# Patient Record
Sex: MALE | Race: OTHER | HISPANIC OR LATINO | URBAN - METROPOLITAN AREA
[De-identification: names, ages, dates, MRNs, and addresses within clinical notes are randomized per-mention and may not be internally consistent; named-entity substitution may affect disease eponyms.]

---

## 2024-07-22 ENCOUNTER — EMERGENCY (EMERGENCY)
Facility: HOSPITAL | Age: 24
LOS: 1 days | Discharge: OTHER INSTITUTION | End: 2024-07-22
Attending: STUDENT IN AN ORGANIZED HEALTH CARE EDUCATION/TRAINING PROGRAM
Payer: COMMERCIAL

## 2024-07-22 VITALS — HEIGHT: 67 IN | WEIGHT: 156.97 LBS

## 2024-07-22 LAB
AMPHET UR-MCNC: NEGATIVE — SIGNIFICANT CHANGE UP
ANION GAP SERPL CALC-SCNC: 16 MMOL/L — SIGNIFICANT CHANGE UP (ref 5–17)
APAP SERPL-MCNC: <3 UG/ML — LOW (ref 10–26)
APPEARANCE UR: CLEAR — SIGNIFICANT CHANGE UP
BACTERIA # UR AUTO: NEGATIVE /HPF — SIGNIFICANT CHANGE UP
BARBITURATES UR SCN-MCNC: NEGATIVE — SIGNIFICANT CHANGE UP
BASOPHILS # BLD AUTO: 0.12 K/UL — SIGNIFICANT CHANGE UP (ref 0–0.2)
BASOPHILS NFR BLD AUTO: 0.7 % — SIGNIFICANT CHANGE UP (ref 0–2)
BENZODIAZ UR-MCNC: NEGATIVE — SIGNIFICANT CHANGE UP
BILIRUB UR-MCNC: NEGATIVE — SIGNIFICANT CHANGE UP
BUN SERPL-MCNC: 9.3 MG/DL — SIGNIFICANT CHANGE UP (ref 8–20)
CALCIUM SERPL-MCNC: 9.1 MG/DL — SIGNIFICANT CHANGE UP (ref 8.4–10.5)
CHLORIDE SERPL-SCNC: 101 MMOL/L — SIGNIFICANT CHANGE UP (ref 96–108)
CO2 SERPL-SCNC: 23 MMOL/L — SIGNIFICANT CHANGE UP (ref 22–29)
COCAINE METAB.OTHER UR-MCNC: NEGATIVE — SIGNIFICANT CHANGE UP
COLOR SPEC: YELLOW — SIGNIFICANT CHANGE UP
CREAT SERPL-MCNC: 0.92 MG/DL — SIGNIFICANT CHANGE UP (ref 0.5–1.3)
DIFF PNL FLD: NEGATIVE — SIGNIFICANT CHANGE UP
EGFR: 120 ML/MIN/1.73M2 — SIGNIFICANT CHANGE UP
EOSINOPHIL # BLD AUTO: 0.03 K/UL — SIGNIFICANT CHANGE UP (ref 0–0.5)
EOSINOPHIL NFR BLD AUTO: 0.2 % — SIGNIFICANT CHANGE UP (ref 0–6)
ETHANOL SERPL-MCNC: <10 MG/DL — SIGNIFICANT CHANGE UP (ref 0–9)
FENTANYL UR QL SCN: POSITIVE
GLUCOSE SERPL-MCNC: 124 MG/DL — HIGH (ref 70–99)
GLUCOSE UR QL: 100 MG/DL
HCT VFR BLD CALC: 44.3 % — SIGNIFICANT CHANGE UP (ref 39–50)
HGB BLD-MCNC: 15.2 G/DL — SIGNIFICANT CHANGE UP (ref 13–17)
IMM GRANULOCYTES NFR BLD AUTO: 1.3 % — HIGH (ref 0–0.9)
KETONES UR-MCNC: NEGATIVE MG/DL — SIGNIFICANT CHANGE UP
LEUKOCYTE ESTERASE UR-ACNC: NEGATIVE — SIGNIFICANT CHANGE UP
LYMPHOCYTES # BLD AUTO: 1.14 K/UL — SIGNIFICANT CHANGE UP (ref 1–3.3)
LYMPHOCYTES # BLD AUTO: 6.9 % — LOW (ref 13–44)
MCHC RBC-ENTMCNC: 28.8 PG — SIGNIFICANT CHANGE UP (ref 27–34)
MCHC RBC-ENTMCNC: 34.3 GM/DL — SIGNIFICANT CHANGE UP (ref 32–36)
MCV RBC AUTO: 83.9 FL — SIGNIFICANT CHANGE UP (ref 80–100)
METHADONE UR-MCNC: NEGATIVE — SIGNIFICANT CHANGE UP
MONOCYTES # BLD AUTO: 0.72 K/UL — SIGNIFICANT CHANGE UP (ref 0–0.9)
MONOCYTES NFR BLD AUTO: 4.3 % — SIGNIFICANT CHANGE UP (ref 2–14)
NEUTROPHILS # BLD AUTO: 14.35 K/UL — HIGH (ref 1.8–7.4)
NEUTROPHILS NFR BLD AUTO: 86.6 % — HIGH (ref 43–77)
NITRITE UR-MCNC: NEGATIVE — SIGNIFICANT CHANGE UP
OPIATES UR-MCNC: NEGATIVE — SIGNIFICANT CHANGE UP
PCP SPEC-MCNC: SIGNIFICANT CHANGE UP
PCP UR-MCNC: NEGATIVE — SIGNIFICANT CHANGE UP
PH UR: 6.5 — SIGNIFICANT CHANGE UP (ref 5–8)
PLATELET # BLD AUTO: 263 K/UL — SIGNIFICANT CHANGE UP (ref 150–400)
POTASSIUM SERPL-MCNC: 4.7 MMOL/L — SIGNIFICANT CHANGE UP (ref 3.5–5.3)
POTASSIUM SERPL-SCNC: 4.7 MMOL/L — SIGNIFICANT CHANGE UP (ref 3.5–5.3)
PROT UR-MCNC: 100 MG/DL
RBC # BLD: 5.28 M/UL — SIGNIFICANT CHANGE UP (ref 4.2–5.8)
RBC # FLD: 12.4 % — SIGNIFICANT CHANGE UP (ref 10.3–14.5)
RBC CASTS # UR COMP ASSIST: 2 /HPF — SIGNIFICANT CHANGE UP (ref 0–4)
SALICYLATES SERPL-MCNC: <0.6 MG/DL — LOW (ref 10–20)
SODIUM SERPL-SCNC: 140 MMOL/L — SIGNIFICANT CHANGE UP (ref 135–145)
SP GR SPEC: 1.02 — SIGNIFICANT CHANGE UP (ref 1–1.03)
SQUAMOUS # UR AUTO: 0 /HPF — SIGNIFICANT CHANGE UP (ref 0–5)
THC UR QL: NEGATIVE — SIGNIFICANT CHANGE UP
UROBILINOGEN FLD QL: 1 MG/DL — SIGNIFICANT CHANGE UP (ref 0.2–1)
WBC # BLD: 16.57 K/UL — HIGH (ref 3.8–10.5)
WBC # FLD AUTO: 16.57 K/UL — HIGH (ref 3.8–10.5)
WBC UR QL: 1 /HPF — SIGNIFICANT CHANGE UP (ref 0–5)

## 2024-07-22 PROCEDURE — 93010 ELECTROCARDIOGRAM REPORT: CPT

## 2024-07-22 PROCEDURE — 99291 CRITICAL CARE FIRST HOUR: CPT

## 2024-07-22 RX ORDER — DIAZEPAM 10 MG/1
5 TABLET ORAL ONCE
Refills: 0 | Status: DISCONTINUED | OUTPATIENT
Start: 2024-07-22 | End: 2024-07-22

## 2024-07-22 RX ORDER — ONDANSETRON HYDROCHLORIDE 2 MG/ML
8 INJECTION INTRAMUSCULAR; INTRAVENOUS ONCE
Refills: 0 | Status: DISCONTINUED | OUTPATIENT
Start: 2024-07-22 | End: 2024-07-30

## 2024-07-22 RX ORDER — METOCLOPRAMIDE 5 MG/5ML
10 SOLUTION ORAL ONCE
Refills: 0 | Status: DISCONTINUED | OUTPATIENT
Start: 2024-07-22 | End: 2024-07-22

## 2024-07-22 RX ORDER — METOCLOPRAMIDE 5 MG/5ML
10 SOLUTION ORAL ONCE
Refills: 0 | Status: COMPLETED | OUTPATIENT
Start: 2024-07-22 | End: 2024-07-22

## 2024-07-22 RX ORDER — DIAZEPAM 10 MG/1
2.5 TABLET ORAL ONCE
Refills: 0 | Status: DISCONTINUED | OUTPATIENT
Start: 2024-07-22 | End: 2024-07-22

## 2024-07-22 RX ORDER — SODIUM CHLORIDE 0.9 % (FLUSH) 0.9 %
1000 SYRINGE (ML) INJECTION ONCE
Refills: 0 | Status: COMPLETED | OUTPATIENT
Start: 2024-07-22 | End: 2024-07-22

## 2024-07-22 RX ADMIN — Medication 1000 MILLILITER(S): at 20:06

## 2024-07-22 RX ADMIN — DIAZEPAM 2.5 MILLIGRAM(S): 10 TABLET ORAL at 20:05

## 2024-07-22 RX ADMIN — METOCLOPRAMIDE 10 MILLIGRAM(S): 5 SOLUTION ORAL at 20:06

## 2024-07-22 NOTE — ED PROVIDER NOTE - NSFOLLOWUPINSTRUCTIONS_ED_ALL_ED_FT
-Please follow-up with your primary care doctor in the next 2 days.  Please call tomorrow for an appointment.  If you cannot follow-up with your primary care doctor please return to the ED for any urgent issues.  - You were given a copy of the tests performed today.  Please bring the results with you and review them with your primary care doctor.  - If you have any worsening of symptoms or any other concerns please return to the ED immediately.  - Please continue taking your home medications as directed.    Substance Abuse  Polysubstance abuse is the abuse of 2 or more drugs that cause impairment or distress. Examples include alcohol, nicotine, marijuana, cocaine, heroin, methamphetamine, hallucinogens such as mushrooms, or inhalants such as paint thinner. Prescribed medicines, such as opioids for pain or benzodiazepines for anxiety, are also commonly abused.    DISCHARGE INSTRUCTIONS:    Call 911 for any of the following:     You feel you might harm yourself or others.         Return to the emergency department if:     You have a seizure.       You have chest pain and your heart is beating faster than usual.       You have new shortness of breath.       You are dizzy and lightheaded.     Contact your healthcare provider or therapist if:     You are using drugs and think you are pregnant.       You have withdrawal symptoms and want to start using drugs again.       You have questions or concerns about your condition or care.     Risks of polysubstance abuse:     Drug dependence is when you continue to use drugs, even when you know the risks. Polysubstance abuse can damage your heart, brain, lungs, liver, and gastrointestinal tract. You continue even when it causes problems with work, school, or relationships. You may have difficulty finding or keeping a job because of your drug dependence.       Drug tolerance is when you need to use more drugs, or use them more often, to get the effects you want. You may not be able to stop using the drugs. When you try to stop, you may have withdrawal symptoms and strong cravings for the drugs.      Drug overdose can occur when you take more drugs than your body can handle. This may be a small amount or a large amount. You can lose consciousness or have a seizure or stroke. Your heart can stop beating, or you can stop breathing. You may die from a drug overdose.     Medicines:     Withdrawal medicines may be given according to the types of drugs you are abusing. Withdrawal from drugs can cause serious, life-threatening side effects. Certain medicines can help decrease your withdrawal symptoms and your desire for the drug. Ask for more information about the withdrawal medicines you may need.       Mood stabilizers may be given to help prevent or treat depression or anxiety caused by drug abuse and withdrawal.       Take your medicine as directed. Contact your healthcare provider if you think your medicine is not helping or if you have side effects. Tell him or her if you are allergic to any medicine. Keep a list of the medicines, vitamins, and herbs you take. Include the amounts, and when and why you take them. Bring the list or the pill bottles to follow-up visits. Carry your medicine list with you in case of an emergency.    Follow up with your healthcare provider as directed: You may be referred to a specialist to treat health conditions caused by your drug use. This includes mental health, heart, or lung specialists. Write down your questions so you remember to ask them during your visits.     Therapy: You may need therapy and support to stop using drugs:     Cognitive and behavioral therapy helps you change your thinking and behavior. It can help you develop plans to avoid the situations that make you want to use drugs. It also helps you cope with the feelings of wanting to use drugs. You may have individual or group therapy.       Contingency management helps you set drug-free goals with a therapist. You will decide ways to celebrate your success when you reach a goal.       Family therapy and support groups allow you and your family members to talk to and be encouraged by other people affected by drug abuse. You and your family members may attend together or separately. Ask your healthcare provider for information about programs in your area.     How polysubstance abuse affects unborn or  babies:     If you are pregnant or get pregnant while using drugs, you may have a miscarriage or give birth early. Your baby may be born addicted to the drugs.      Do not breastfeed your baby if you use drugs. Drugs pass from your bloodstream into your breast milk and affect your baby's health. Talk with your healthcare provider if you are using drugs and breastfeeding.    Interested in discussing options to reduce your alcohol or drug use?      [facility]: [phone]   [facility] Substance Abuse Services: [phone], option #2   Methadone Maintenance & Ambulatory Opiate Detox: [phone]  [facility]: [phone]  [facility]: [phone]  [facility]: [phone]    [facility]: [phone], option #2   [facility]: [phone]    [facility]: [phone]    [facility] Central Intake: [phone]  [facility] Chemical Dependency/Ancillary Withdrawal: [phone]  [facility] Methadone Maintenance: [phone]    [facility]: [phone]  [facility] Addiction Treatment Services: [phone]    [facility]: 8-261-3-HOPENY    [facility] Office of Alcoholism and Substance Abuse Services (OASAS): https://www.oasas.[place].gov/providerdirectory/  [facility] for Addiction Services and Psychotherapy Interventions Research (CASPIR)  www.[facility].org     Interested in discussing options to reduce your tobacco use?    [facility] for Tobacco Control:  [phone]  [facility] QUITLINE: 9-909-MS-QUITS ([phone])    Interested in learning more about substance use?      http://rethinkingdrinking.niaaa.nih.gov   https://www.drugabuse.gov/patients-families     Learn more about opioid overdose prevention programs in [facility]:  http://www.health.ny.gov/diseases/aids/general/opioid_overdose_prevention/

## 2024-07-22 NOTE — ED PROVIDER NOTE - ATTENDING CONTRIBUTION TO CARE
I, Raghu Houston MD, spent 35 minutes of critical care time with this patient. This does not include time spent on separately reported billable procedures.    I, Raghu Houston MD, performed the initial face to face bedside interview with this patient regarding history of present illness, review of symptoms and relevant past medical, social and family history.  I completed an independent physical examination.  I was the initial provider who evaluated this patient. I have signed out the follow up of any pending tests (i.e. labs, radiological studies) to the resident.  I have communicated the patient’s plan of care and disposition with the resident.    24 year old male c/o malaise after taking Percocet. AVSS. PE unremarkable. supportive care. I, Raghu Houston MD, spent 35 minutes of critical care time with this patient. This does not include time spent on separately reported billable procedures.    I, Raghu Houston MD, performed the initial face to face bedside interview with this patient regarding history of present illness, review of symptoms and relevant past medical, social and family history.  I completed an independent physical examination.  I was the initial provider who evaluated this patient. I have signed out the follow up of any pending tests (i.e. labs, radiological studies) to the resident.  I have communicated the patient’s plan of care and disposition with the resident.    24 year old male c/o malaise after taking Percocet. + suicide attempt. + depression AVSS. PE unremarkable. Psychiatry assessment

## 2024-07-22 NOTE — ED PROVIDER NOTE - PROGRESS NOTE DETAILS
Pt now admitting w/ mother at bedside that he no longer wants to live and endorsing passive SI. No specific plan. Will place 1:1, psych eval. -Amelia

## 2024-07-22 NOTE — ED PROVIDER NOTE - CLINICAL SUMMARY MEDICAL DECISION MAKING FREE TEXT BOX
24-year-old male patient no past medical history accidentally overdosed on Percocet.  4 of Narcan IN on the scene.  Awake alert, occasional vomiting.  Antiemetics and observation in the ED.

## 2024-07-22 NOTE — ED ADULT NURSE NOTE - OBJECTIVE STATEMENT
pt is AxOx3, c/o overdosing on percocet, pt states he "doesn't remember how many he took" breathing even and unlabored. pt is aware of plan of care.

## 2024-07-22 NOTE — ED ADULT TRIAGE NOTE - CHIEF COMPLAINT QUOTE
Pt BIBA after taking a percocet and becoming unresponsive. Was given Narcan 4mg IN by [employer]. Pt awake and vomiting

## 2024-07-22 NOTE — ED PROVIDER NOTE - OBJECTIVE STATEMENT
24-year-old male patient no past medical history accidentally overdosed on Percocet today.  Does not take Percocet every day.  Was found by [facility] police and given 4 of Narcan intranasally.  Woke up and feels nauseous.  Occasional vomiting.  No other complaints.  No recent traumas or falls. 24-year-old male patient no past medical history accidentally overdosed on Percocet today.  Does not take Percocet every day.  Was found by [facility] police and given 4 of Narcan intranasally.  Woke up and feels nauseous.  Occasional vomiting.  No other complaints.  No recent traumas or falls. denying SI/HI

## 2024-07-23 DIAGNOSIS — F39 UNSPECIFIED MOOD [AFFECTIVE] DISORDER: ICD-10-CM

## 2024-07-23 LAB
HCT VFR BLD CALC: 40.8 % — SIGNIFICANT CHANGE UP (ref 39–50)
HGB BLD-MCNC: 14.3 G/DL — SIGNIFICANT CHANGE UP (ref 13–17)
MCHC RBC-ENTMCNC: 29.6 PG — SIGNIFICANT CHANGE UP (ref 27–34)
MCHC RBC-ENTMCNC: 35 GM/DL — SIGNIFICANT CHANGE UP (ref 32–36)
MCV RBC AUTO: 84.5 FL — SIGNIFICANT CHANGE UP (ref 80–100)
PLATELET # BLD AUTO: 207 K/UL — SIGNIFICANT CHANGE UP (ref 150–400)
RBC # BLD: 4.83 M/UL — SIGNIFICANT CHANGE UP (ref 4.2–5.8)
RBC # FLD: 12.2 % — SIGNIFICANT CHANGE UP (ref 10.3–14.5)
SARS-COV-2 RNA SPEC QL NAA+PROBE: SIGNIFICANT CHANGE UP
WBC # BLD: 9.36 K/UL — SIGNIFICANT CHANGE UP (ref 3.8–10.5)
WBC # FLD AUTO: 9.36 K/UL — SIGNIFICANT CHANGE UP (ref 3.8–10.5)

## 2024-07-23 PROCEDURE — 99213 OFFICE O/P EST LOW 20 MIN: CPT

## 2024-07-23 PROCEDURE — 96375 TX/PRO/DX INJ NEW DRUG ADDON: CPT

## 2024-07-23 PROCEDURE — 99291 CRITICAL CARE FIRST HOUR: CPT | Mod: 25

## 2024-07-23 PROCEDURE — 99223 1ST HOSP IP/OBS HIGH 75: CPT

## 2024-07-23 PROCEDURE — 36415 COLL VENOUS BLD VENIPUNCTURE: CPT

## 2024-07-23 PROCEDURE — G0378: CPT

## 2024-07-23 PROCEDURE — 93005 ELECTROCARDIOGRAM TRACING: CPT

## 2024-07-23 PROCEDURE — 87635 SARS-COV-2 COVID-19 AMP PRB: CPT

## 2024-07-23 PROCEDURE — 80048 BASIC METABOLIC PNL TOTAL CA: CPT

## 2024-07-23 PROCEDURE — 85027 COMPLETE CBC AUTOMATED: CPT

## 2024-07-23 PROCEDURE — 90792 PSYCH DIAG EVAL W/MED SRVCS: CPT | Mod: 95

## 2024-07-23 PROCEDURE — 80307 DRUG TEST PRSMV CHEM ANLYZR: CPT

## 2024-07-23 PROCEDURE — 82962 GLUCOSE BLOOD TEST: CPT

## 2024-07-23 PROCEDURE — 85025 COMPLETE CBC W/AUTO DIFF WBC: CPT

## 2024-07-23 PROCEDURE — 96374 THER/PROPH/DIAG INJ IV PUSH: CPT

## 2024-07-23 PROCEDURE — 81001 URINALYSIS AUTO W/SCOPE: CPT

## 2024-07-23 NOTE — ED BEHAVIORAL HEALTH ASSESSMENT NOTE - NSBHMSEGAIT_PSY_A_CORE
[facility] Registered Nurse placed a call to the parent to inform them of the patient's test results and MD recommendation in regards to the message below from Dr. Ridley.   Parent verbalized understanding.     RN used an .     ----- Message from Sofia Ridley MD sent at 2/11/2022  4:52 PM CST -----  Please contact mom using a  and let her know that the radiologist reported a mild curvature of the back.  I would suggest that we see him back here in 6 months for re-evaluation and the x-ray can be repeated at that point.       Unable to assess

## 2024-07-23 NOTE — ED BEHAVIORAL HEALTH ASSESSMENT NOTE - RISK ASSESSMENT
rf - overdose, decline in functioning, unemployment, substance use, poor insight/judgment, not in outpt care  pf - supportive family, young, healthy, no prior adm, no prior SAs, future oriented

## 2024-07-23 NOTE — ED BEHAVIORAL HEALTH PROGRESS NOTE - SUMMARY
Overdosed on Percocet one year ago  1 prior overdose one year ago; hx DWI; loss of his father 3 years ago; not connected to mental health treatment

## 2024-07-23 NOTE — ED BEHAVIORAL HEALTH NOTE - BEHAVIORAL HEALTH NOTE
LMSW attempted to obtain collateral from Alivia, girlfriend - [phone]. Unable to reach, LVM providing callback #. Attending aware.

## 2024-07-23 NOTE — ED BEHAVIORAL HEALTH NOTE - BEHAVIORAL HEALTH NOTE
=======================  FOR EACH COLLATERAL  ========================  Collateral below has requested that the information provided remain confidential: Yes [  ] No [x]  Collateral below has provided information that patient is/may be unaware of: Yes [  ] No [x]    Rationale for overriding objection            (  ) Lack of capacity. Details: ________            (x) Assessing risk of danger to self/others.  Rationale for selecting specific collateral source            (  ) Potential to impact risk of danger to self/others and no alternative equivalent. Details: _____  NAME: Shayy  NUMBER: [phone]  RELATIONSHIP: Mother  RELIABILITY: Good  COMMENTS: [place]ian speaking  ========================  PATIENT DEMOGRAPHICS:  ========================  HPI Pt is a 25 y/o male BIB EMS after being found unresponsive from an overdose,   BASELINE FUNCTIONING:  DATE HPI STARTED: 3 years ago, patient's father passed away.   DECOMPENSATION: As per mom, pt became very depressed after his father passed and began drinking and doing drugs. Pt does not have any healthy coping skills. Pt does not endorse any reasons to live and has been making statements such as "what's the point of living if my dad isn't alive." Pt had an overdose on fentanyl in May 2023 and mom believes that was a suicide attempt. Mom reports today pt left without sayung where he was going and then later received a phone call from the hospital reporting another overdose. Mom reports at bedside pt endorsed not wanting to live. Mom reports pt has been stressed over the passing of his father and not being able to find work to support his 7 month old child, with whom his gf and child live with. Mom reports pt has an increased appetite, no sleep and pt spends his days playing video games. Mom also reports increased anxiety. Mom reports she feels that pt is a danger to himself as he has no healthy coping skills, refuses to engage in treatment, and has been engaging in drug use resulting in overdoses. Mom is advocating for admission.   SUICIDALITY See note  VIOLENCE: None reported  SUBSTANCE: Fentanyl, Cannabis, Alcohol  ========================  PAST PSYCHIATRIC HISTORY  ========================  DATE PAST PSYCHIATRIC HISTORY STARTED:  MAIN PSYCHIATRIC DIAGNOSIS:   PSYCHIATRIC HOSPITALIZATIONS: No prior psych admissions  PRIOR ILLNESS:  SUICIDALITY: Mom believes pt's overdose in May 2023 was a suicide attempt  VIOLENCE: None reported  SUBSTANCE USE: Fentanyl, Alcohol  ==============  OTHER HISTORY  ==============  CURRENT MEDICATION: None reported   MEDICAL HISTORY: None reported  ALLERGIES None reported   LEGAL ISSUES: Pt is on probation for stealing his mother's car and crashing it.   FIREARM ACCESS: None reported  SOCIAL HISTORY: Pt resides with his mother, girlfriend and 7 month old child  FAMILY HISTORY: None reported  DEVELOPMENTAL HISTORY: None reported  -----------------------------------------------  COVID Exposure Screen- collateral (i.e. third-party, chart review, belongings, etc; include EMS and ED staff)  ---------------------------------------------------  1. Has the patient had a COVID-19 test in the last 90 days? Unknown.  2. Has the patient tested positive for COVID-19 antibodies? Unknown.  3.Has the patient received 2 doses of the COVID-19 vaccine?  Unknown.  4. In the past 10 days, has the patient been around anyone with a positive COVID-19 test?* Unknown.5.Has the patient been out of [place] within the past 10 days? Unknown.

## 2024-07-23 NOTE — ED CDU PROVIDER INITIAL DAY NOTE - OBJECTIVE STATEMENT
24-year-old male patient no past medical history accidentally overdosed on Percocet today.  Does not take Percocet every day.  Was found by [facility] police and given 4 of Narcan intranasally.  Woke up and feels nauseous.  Occasional vomiting.  No other complaints.  No recent traumas or falls. denying SI/HI

## 2024-07-23 NOTE — ED BEHAVIORAL HEALTH ASSESSMENT NOTE - SUMMARY
Pt is a 23yo male, domiciled with mother, girlfriend, and 7mo baby, employed in [employer], with no formal pph, no prior admissions, in outpatient substance use tx at [facility], no past reported SAs, on probation for DWI, hx of opioid abuse, and no known pmh. He is bibems when found by police after an overdose on percocet; psych consult for SI.    Patient presents after an overdose on percocet,     Mother states patient has become increasingly depressed, withdrawn, endorsing suicidal thoughts.   His father's passing 3 years ago was a Pt is a 23yo male, domiciled with mother, girlfriend, and 7mo baby, employed in [employer], with no formal pph, no prior admissions, in outpatient substance use tx at [facility], no past reported SAs, on probation for DWI, hx of opioid abuse, and no known pmh. He is bibems when found by police after an overdose on percocet; psych consult for SI.    Patient presents after an overdose on percocet     Mother states patient has become increasingly depressed, withdrawn, and endorsing suicidal thoughts.         He meets criteria for inpatient admission at this time as he appears to be an acute danger to himself. Pt is a 23yo male, domiciled with mother, girlfriend, and 7mo baby, employed in [employer], with no formal pph, no prior admissions, in outpatient substance use tx at [facility], no past reported SAs, on probation for DWI, hx of opioid abuse, and no known pmh. He is bibems when found by police after an overdose on percocet; psych consult for SI.    Patient presents with worsening and mood substance use in context of multiple recent stressors and poor coping skills. Pt took an overdose of most likely fentanyl and was found and given narcan. While he denies this is a suicidal attempt or self harm, patient's mother suspects this is the second overdose and attempt with first being last year. The death of his father, difficulty with employment, and  baby have been major stressors in his life. Mother is concerned with patient's worsening mood and increasing suicidal statements and advocating for inpatient admission. He meets criteria for involuntary hospitalization as he poses an acute danger to himself at this time and would benefit from admission for safety and stabilization.

## 2024-07-23 NOTE — ED BEHAVIORAL HEALTH PROGRESS NOTE - CASE SUMMARY/FORMULATION (CLEARLY DOCUMENT RATIONALE FOR DISPOSITION CHANGE)
25yo male, domiciled with mother, girlfriend, and 7 mo baby, intermittently employed in [employer], no pmhx, in outpatient substance use tx at [facility] due to DWI 2 years ago, currently on probation, hx of opioid and EtOH abuse, and no known PMHx BIB EMS after overdosing on percocet. Upon assessment this morning, patient states he would like to go home today, denying suicidal ideation, thoughts of harming others, AH/VH or paranoia. Pt also denies past thoughts of harming himself or suicidal attempts; pt states he took Percocet yesterday with a friend with no intent to harm himself and adds he also overdosed on Percocet one year ago also with no intent to harm himself; pt endorses his depression be a "1 or 2" currently on a scale of 1 to 10 with 10 being the most severe. Pt states he has a good relationship with his girlfriend, denies significant life stressors and states he wants to go home to "go back to my baby." Pt presents with minimal insight and poor judgement, minimizing his symptoms, stressors and recent actions. Given pt's multiple risk factors including hx substance abuse, recent overdose, being on probation, unsteady employment, caring for an infant with limited resources and the loss of his father 3 years ago while not ever being connected to mental health treatment, pt remains an acute danger to self and meets criteria for involuntary psych admission. Pt will be held for invol transfer pending bed availability.  23yo male, domiciled with mother, girlfriend, and 7 mo baby, intermittently employed in [email or web address], no pmhx, in outpatient substance use tx at [facility] due to DWI 2 years ago, currently on probation, hx of opioid and EtOH abuse, and no known PMHx BIB EMS after overdosing on percocet.     Upon assessment this morning, patient states he would like to go home today, denying suicidal ideation, thoughts of harming others, AH/VH or paranoia. Pt also denies past thoughts of harming himself or suicidal attempts; pt states he took Percocet yesterday with a friend with no intent to harm himself and adds he also overdosed on Percocet one year ago also with no intent to harm himself; pt endorses his depression be a "1 or 2" currently on a scale of 1 to 10 with 10 being the most severe. Pt states he has a good relationship with his girlfriend, denies significant life stressors and states he wants to go home to "go back to my baby." Pt presents with minimal insight and poor judgement, minimizing his symptoms, stressors and recent actions.     Given pt's multiple risk factors including hx substance abuse, recent overdose, being on probation, unsteady employment, caring for an infant with limited resources and the loss of his father 3 years ago while not ever being connected to mental health treatment, pt remains an acute danger to self and meets criteria for involuntary psych admission. Pt will be held for invol transfer pending bed availability.

## 2024-07-23 NOTE — ED BEHAVIORAL HEALTH NOTE - BEHAVIORAL HEALTH NOTE
MISHEL Note: Discussed reason for ED visit and BH tx recommendation in am rounds. Plan is to transfer pt for IP psychiatric care. The [facility]  transfer form has already been submitted to the telepsych team for bed search. Psych PA and  RN aware covid is needed for transfer packet. MISHEL will work on [facility] legals

## 2024-07-23 NOTE — ED BEHAVIORAL HEALTH PROGRESS NOTE - DETAILS:
Pt is a 25yo male, domiciled with mother, girlfriend, and 7mo baby, employed in [employer], no pmhx, in outpatient substance use tx at [facility], no past reported SAs, on probation for DWI, hx of opioid abuse, and no known pmh. He is bibems when found by police after an overdose on percocet. Patient states he would like to go home today. Denies current suicidal ideations, thoughts of harming others, AH/VH. Denies past thoughts of harming himself or suicidal attempts. States he took Percocet yesterday where there was no intent to harm himself. States he also overdosed on Percocet one year ago with no intent to harm himself. States he has a good relationship with his girlfriend. Denies life stressors. States he wants to go home to "go back to my baby." Reports drinking alcohol in the past where he would drink 45 times a week, 6 drinks a day. Reports no firearms in the home. Denies illicit drug use such as heroin or cocaine, denies tobacco use.    Pt is a 25yo male, domiciled with mother, girlfriend, and 7mo baby, employed in [employer], no pmhx, in outpatient substance use tx at [facility], no past reported SAs, on probation for DWI, hx of opioid abuse, and no known pmh. He is bibems when found by police after an overdose on percocet. Patient states he would like to go home today. Denies current suicidal ideations, thoughts of harming others, AH/VH. Denies past thoughts of harming himself or suicidal attempts. States he took Percocet yesterday where there was no intent to harm himself. States he also overdosed on Percocet one year ago with no intent to harm himself. States he has a good relationship with his girlfriend. Denies life stressors. States he wants to go home to "go back to my baby." Reports drinking alcohol in the past where he would drink 45 times a week, 6 drinks a day. Reports no firearms in the home. Denies illicit drug use such as heroin or cocaine, denies tobacco use.     Collateral from Girlfriend   Describes the patient as an "emotional person" ever since his father passed away. States they haven been in a relationship for almost 2 years now. States she knows the patient last had alcohol around 2 weeks ago. Does not know most recent use since she has been staying with her mom the past couple of days for visiting purposes. Girlfriend was not present at the time of the overdose and finds this "surprising" that he would do something like this. Reports the patient has never expressed suicidal ideations and has never has suicidal attempts as far as she knows. Reports the patient gets "anger issues" whenever he drinks alcohol. States he will throw things in the house. Denies the patient ever being physically or emotionally aggressive with her. Patient has told girlfriend that he has felt both depressed and anxious the past couple of months. States she wants the patient to get inpatient therapy, believing that would be the best thing for him.    Collateral from Nancie carreon:   Describes the patient as an "emotional person" ever since his father passed away 3 years ago. They haven been in a relationship for almost 2 years now. She knows the patient last had alcohol around 2 weeks ago but does not know most recent use since she has been staying with her mom the past couple of days for "visiting purposes". Denies this being due to conflict with the pt. Girlfriend was not present at the time of the overdose and finds this "surprising" that he would do something like this; reports patient has never expressed suicidal ideations and has never has suicidal attempts as far as she knows but does endorse "anger issues" whenever he drinks; will throw things in the house but denies the patient ever being physically aggressive with her. Patient has told girlfriend that he has felt both depressed and anxious the past couple of months. Girlfriend wants the patient to get inpatient treatment and believes that would be the best for him.
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
## 2024-07-23 NOTE — ED BEHAVIORAL HEALTH PROGRESS NOTE - RISK ASSESSMENT
Risk Factors - stressors (7 month old baby and does not have a consistent job), overdosed on Percocet  Protective factors - responsibility to 7 month hold baby, denies thoughts of harming himself or others, denies taking illicit drugs, lives in a stable residence (apartment)        Risk Factors - stressors (7 month old baby and does not have a consistent job), overdosed on Percocet, recent passing of his father, history alcohol abuse   Protective factors - responsibility to 7 month hold baby, denies thoughts of harming himself or others, denies taking illicit drugs, lives in a stable residence (apartment)        Risk Factors - 7 month old baby, inconsistent employment, recent overdose on Percocet (in addition to prior overdose 1 year ago), loss of father 3 years ago, history alcohol abuse and opioid abuse, not connected to mental health treatment  Protective factors - responsibility to child, denies thoughts of harming himself or others, domiciled, intermittent employment, no aggression in ED

## 2024-07-24 VITALS
OXYGEN SATURATION: 96 % | TEMPERATURE: 98 F | RESPIRATION RATE: 18 BRPM | DIASTOLIC BLOOD PRESSURE: 73 MMHG | HEART RATE: 87 BPM | SYSTOLIC BLOOD PRESSURE: 125 MMHG

## 2024-07-24 PROCEDURE — 99233 SBSQ HOSP IP/OBS HIGH 50: CPT

## 2024-07-24 NOTE — ED CDU PROVIDER DISPOSITION NOTE - CLINICAL COURSE
The patient is medically stable for transfer to [facility] for further treatment
24y M presented after Percocet overdose, endorsing SI. Medically cleared. Seen by psych, advising involuntary admission. Accepted to [facility].

## 2024-07-24 NOTE — ED CDU PROVIDER DISPOSITION NOTE - NSBENEFITOFTRANSFER_ED_A_ED
Obtain Level of Care/Service Not Available at this Facility
Obtain Level of Care/Service Not Available at this Facility

## 2024-07-24 NOTE — ED CDU PROVIDER DISPOSITION NOTE - NSRISKOFTRANSFER_ED_A_ED
Deterioration of Condition En Route
Transportation Risk (There is always a risk of traffic delays resulting in deterioration of condition.)

## 2024-07-24 NOTE — ED ADULT NURSE REASSESSMENT NOTE - GENERAL PATIENT STATE
comfortable appearance/resting/sleeping
anxious/comfortable appearance/cooperative
comfortable appearance/cooperative
comfortable appearance/cooperative
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
## 2024-07-24 NOTE — ED ADULT NURSE REASSESSMENT NOTE - COMFORT CARE
ambulated to bathroom/meal provided/plan of care explained/po fluids offered
plan of care explained
meal provided/plan of care explained/po fluids offered/wait time explained